# Patient Record
Sex: MALE | Race: BLACK OR AFRICAN AMERICAN | NOT HISPANIC OR LATINO | Employment: FULL TIME | URBAN - METROPOLITAN AREA
[De-identification: names, ages, dates, MRNs, and addresses within clinical notes are randomized per-mention and may not be internally consistent; named-entity substitution may affect disease eponyms.]

---

## 2022-06-11 ENCOUNTER — HOSPITAL ENCOUNTER (EMERGENCY)
Facility: HOSPITAL | Age: 32
Discharge: HOME | End: 2022-06-11
Attending: EMERGENCY MEDICINE

## 2022-06-11 VITALS
WEIGHT: 300 LBS | RESPIRATION RATE: 18 BRPM | DIASTOLIC BLOOD PRESSURE: 95 MMHG | BODY MASS INDEX: 39.76 KG/M2 | SYSTOLIC BLOOD PRESSURE: 165 MMHG | HEART RATE: 85 BPM | TEMPERATURE: 98 F | OXYGEN SATURATION: 96 % | HEIGHT: 73 IN

## 2022-06-11 DIAGNOSIS — S93.601A SPRAIN OF RIGHT FOOT, INITIAL ENCOUNTER: Primary | ICD-10-CM

## 2022-06-11 DIAGNOSIS — R60.9 SWELLING: ICD-10-CM

## 2022-06-11 PROCEDURE — 99283 EMERGENCY DEPT VISIT LOW MDM: CPT

## 2022-06-11 RX ORDER — NAPROXEN 500 MG/1
500 TABLET ORAL 2 TIMES DAILY WITH MEALS
Qty: 30 TABLET | Refills: 0 | Status: SHIPPED | OUTPATIENT
Start: 2022-06-11

## 2022-06-11 NOTE — ED NOTES
Ace wrap applied to right ankle.  Pt instructed on use of crutches and demonstrated their proper use.  Pt given reusable ice pack.

## 2022-06-11 NOTE — ED PROVIDER NOTES
Encounter Date: 6/11/2022       History     Chief Complaint   Patient presents with    Foot Injury     X 2 DAYS AGO, RT FOOT, SWELLING TODAY     32-year-old male presented emergency department with right foot and ankle pain.  Patient said he stepped wrong 2 days ago and felt pain in the right foot and since then having pain with weight-bearing.  Patient does have slight swelling on the dorsal aspect of the right foot.  Denies any other injuries.  Denies fever or chills.        Review of patient's allergies indicates:  No Known Allergies  Past Medical History:   Diagnosis Date    Obese      No past surgical history on file.  No family history on file.     Review of Systems   Constitutional: Negative.    HENT: Negative.    Eyes: Negative.    Respiratory: Negative.    Cardiovascular: Negative.    Gastrointestinal: Negative.    Endocrine: Negative.    Genitourinary: Negative.    Musculoskeletal: Positive for joint swelling.        Right foot swelling and tenderness on the dorsal aspect and has pain in that area.   Skin: Negative.    Allergic/Immunologic: Negative.    Neurological: Negative.    Hematological: Negative.    Psychiatric/Behavioral: Negative.    All other systems reviewed and are negative.      Physical Exam     Initial Vitals [06/11/22 1130]   BP Pulse Resp Temp SpO2   (!) 185/102 81 20 98.1 °F (36.7 °C) 95 %      MAP       --         Physical Exam    Nursing note and vitals reviewed.  Constitutional: He appears well-developed and well-nourished.   HENT:   Head: Normocephalic and atraumatic.   Nose: Nose normal.   Eyes: Conjunctivae and EOM are normal.   Neck: No tracheal deviation present.   Normal range of motion.  Cardiovascular: Normal rate, regular rhythm, normal heart sounds and intact distal pulses. Exam reveals no friction rub.    No murmur heard.  Pulmonary/Chest: Breath sounds normal. No respiratory distress. He has no wheezes. He has no rales.   Abdominal: Abdomen is soft. He exhibits no  distension. There is no abdominal tenderness.   Musculoskeletal:         General: Tenderness present. Normal range of motion.      Cervical back: Normal range of motion.      Comments: Tenderness on the dorsal aspect of the right foot with mild swelling.  No tenderness of the ankle.  No tenderness in the Achillis tendon area.  Full range of motion of the ankle joint.  Extremities neurovascularly intact.  No redness or signs of infection     Neurological: He is alert and oriented to person, place, and time. He has normal strength. GCS score is 15. GCS eye subscore is 4. GCS verbal subscore is 5. GCS motor subscore is 6.   Skin: Skin is warm and dry. Capillary refill takes less than 2 seconds.   Psychiatric: He has a normal mood and affect. Thought content normal.         ED Course   Procedures  Labs Reviewed - No data to display       Imaging Results          X-Ray Ankle Complete Right (Final result)  Result time 06/11/22 12:16:21    Final result by Nestor Perales MD (06/11/22 12:16:21)                 Narrative:    Reason: Foot Injury (X 2 DAYS AGO, RT FOOT, SWELLING TODAY)    FINDINGS:  Three views right ankle show no fracture, dislocation, or destructive osseous lesion. Soft tissues are unremarkable.    IMPRESSION:  Normal right ankle.    Electronically signed by:  Nestor Perales MD  6/11/2022 12:16 PM CDT Workstation: [id number]                             X-Ray Foot Complete Right (Final result)  Result time 06/11/22 12:17:40    Final result by Nestor Perales MD (06/11/22 12:17:40)                 Narrative:    Reason: Foot Injury (X 2 DAYS AGO, RT FOOT, SWELLING TODAY)    FINDINGS:  Three views of right foot show no fracture, dislocation, or destructive osseous lesion. Soft tissues are unremarkable. 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    IMPRESSION:  No acute right foot abnormality.    Electronically signed by:  Nestor Perales MD  6/11/2022 12:17 PM CDT Workstation: [id number]                                Medications   ibuprofen tablet 600 mg (has no administration in time range)     Medical Decision Making:   Differential Diagnosis:   32-year-old male with right foot sprain.  X-rays unremarkable and negative for fracture.  Ace wrap applied and extremities neurovascularly intact after Ace wrap application.  Crutches given and advised not to bear weight until pain resolves.  Pain treated.  Discharged with instructions and follow-up and return precautions.  Clinical Tests:   Radiological Study: Reviewed                      Clinical Impression:   Final diagnoses:  [R60.9] Swelling  [S93.601A] Sprain of right foot, initial encounter (Primary)          ED Disposition Condition    Discharge Stable        ED Prescriptions     Medication Sig Dispense Start Date End Date Auth. Provider    naproxen (NAPROSYN) 500 MG tablet Take 1 tablet (500 mg total) by mouth 2 (two) times daily with meals. 30 tablet 6/11/2022  Ethan Osman MD        Follow-up Information     Follow up With Specialties Details Why Contact Info    [facility]  In 2 days  [address] [facility] [address]  [phone]             Ethan Osman MD  06/11/22 1165

## 2022-06-11 NOTE — Clinical Note
"Miguel Ruizausten Keating was seen and treated in our emergency department on 6/11/2022.  He may return to work on 06/16/2022.       If you have any questions or concerns, please don't hesitate to call.      KEIKO Bolden RN    "

## 2022-11-10 ENCOUNTER — HOSPITAL ENCOUNTER (EMERGENCY)
Facility: HOSPITAL | Age: 32
Discharge: HOME | End: 2022-11-10
Attending: EMERGENCY MEDICINE

## 2022-11-10 VITALS
TEMPERATURE: 98 F | OXYGEN SATURATION: 98 % | RESPIRATION RATE: 18 BRPM | WEIGHT: 280 LBS | DIASTOLIC BLOOD PRESSURE: 99 MMHG | HEART RATE: 69 BPM | SYSTOLIC BLOOD PRESSURE: 170 MMHG | BODY MASS INDEX: 36.94 KG/M2

## 2022-11-10 DIAGNOSIS — M72.2 PLANTAR FASCIITIS: Primary | ICD-10-CM

## 2022-11-10 DIAGNOSIS — R52 PAIN: ICD-10-CM

## 2022-11-10 PROCEDURE — 99283 EMERGENCY DEPT VISIT LOW MDM: CPT

## 2022-11-10 PROCEDURE — 25000003 PHARM REV CODE 250: Performed by: NURSE PRACTITIONER

## 2022-11-10 RX ORDER — NAPROXEN 250 MG/1
500 TABLET ORAL
Status: COMPLETED | OUTPATIENT
Start: 2022-11-10 | End: 2022-11-10

## 2022-11-10 RX ORDER — DICLOFENAC SODIUM 50 MG/1
50 TABLET, DELAYED RELEASE ORAL 3 TIMES DAILY PRN
Qty: 20 TABLET | Refills: 2 | Status: SHIPPED | OUTPATIENT
Start: 2022-11-10

## 2022-11-10 RX ADMIN — NAPROXEN 500 MG: 250 TABLET ORAL at 02:11

## 2022-11-10 NOTE — DISCHARGE INSTRUCTIONS
By hard arch supports for your shoes.  Do not wear flat shoes.  Do the stretching exercises I have shown you.

## 2022-11-16 ENCOUNTER — PATIENT OUTREACH (OUTPATIENT)
Dept: EMERGENCY MEDICINE | Facility: HOSPITAL | Age: 32
End: 2022-11-16

## 2022-11-16 NOTE — PROGRESS NOTES
Abby Bailey  ED Navigator  Emergency Department    Project: [facility] ED Navigator  Role: Community Health Worker    Date: 11/16/2022  Patient Name: Miguel Keating  MRN: [id number]  PCP: Primary Doctor No    Assessment:     Miguel Keating is a 32 y.o. male who has presented to ED for foot pain. Patient has visited the ED 1 times in the past 3 months. Patient did not contact PCP.     ED Navigator Initial Assessment    ED Navigator Enrollment Documentation  Consent to Services  Does patient consent to completing the assessment?: Yes  Contact  Method of Initial Contact: Phone  Transportation  Does the patient have issues with Transportation?: No  Does the patient have transportation to and from healthcare appointments?: Yes  Insurance Coverage  Do you have coverage/adequate coverage?: No  Reason for no/inadequate coverage: Uninsured  Specialist Appointment  Did the patient come to the ED to see a specialist?: No  Does the patient have a pending specialist referral?: No  Does the patient have a specialist appointment made?: No  PCP Follow Up Appointment  Has the patient had an appointment with a primary care provider in the past year?: No  Does the patient have a follow up appontment with a PCP?: No  When was the last time you saw your PCP?: 11/16/21  Why does the patient not have a follow up scheduled?: No established [facility]/outside PCP  Would you like an [facility] PCP?: Yes  Medications  Is patient able to afford medication?: Yes  Is patient unable to get medication due to lack of transportation?: No  Psychological  Does the patient have psycho-social concerns?: No  Food  Does the patient have concerns about food?: No  Communication/Education  Does the patient have limited English proficiency/English not primary language?: No  Does patient have low literacy and/or low health literacy?: Yes  Does patient have concerns with care?: No  Does patient have dissatisfaction with care?: No  Other Financial Concerns  Does the patient have  immediate financial distress?: No  Does the patient have general financial concerns?: No  Other Social Barriers/Concerns  Does the patient have any additional barriers or concerns?: Dental  Primary Barrier  Barriers identified: Financial barrier (health insurance, employment, etc.)  Root Cause of ED Utilization: Lack of Access to Primary Care  Plan to address Lack of Access to Primary Care: Provided information for [facility] ([facility], [place], etc.), Provided information for [facility] On [facility] 24/7 Nurse Triage line [phone] or [phone] ([phone])  Next steps: Provided Education  Was education/educational materials provided surrounding PCP services/creating a medical home?: Yes Was education verbal or written?: Written     Was education/educational materials provided surrounding low cost, healthy foods?: Yes Was education verbal or written?: Written     Was education/educational materials provided surrounding other items? If so, use comment to explain.: Yes Was education verbal or written?: Written   Plan: Provided information for [facility] ([facility], [employer])  Expected Date of Follow Up 1: 12/14/22         Social History     Socioeconomic History    Marital status: Single     Social Determinants of Health     Financial Resource Strain: Low Risk     Difficulty of Paying Living Expenses: Not very hard   Food Insecurity: No Food Insecurity    Worried About Running Out of Food in the Last Year: Never true    Ran Out of Food in the Last Year: Never true   Transportation Needs: No Transportation Needs    Lack of Transportation (Medical): No    Lack of Transportation (Non-Medical): No   Stress: No Stress Concern Present    Feeling of Stress : Not at all   Social Connections: Unknown    Frequency of Communication with Friends and Family: Three  times a week    Frequency of Social Gatherings with Friends and Family: Three times a week    Marital Status: Never    Housing Stability: Unknown    Unable to Pay for Housing in the Last Year: No    Unstable Housing in the Last Year: No       Plan:   Spoke with patient on the telephone and completed initial enrollment.  Patient reported no concerns with food, housing, utilities, or transportation.  Patient stated he has never applied for Medicaid, and I provided information on how to do that.  Patient does not have a PCP, and I provided education on [facility] for healthcare.  Patient stated he has a chipped tooth, and I provided education on dental resources.  Patient is a smoker, does not drink alcohol, and denied feelings of depression/anxiety.  Patient was given education on ([facility] information, [facility] information, and Heart healthy diet tips), [facility] Nurse Triage line, and [facility].    Abby Bailey  ED Navigator

## 2022-12-15 ENCOUNTER — PATIENT OUTREACH (OUTPATIENT)
Dept: EMERGENCY MEDICINE | Facility: HOSPITAL | Age: 32
End: 2022-12-15

## 2022-12-16 NOTE — PROGRESS NOTES
ED navigator spoke to Mr. Rivera who advised that he was feeling much better. He advised that he had not had any appointments. He denied the need for any new resources. I advised that if he needed anything he could reach out and that I would follow up in a few weeks. He agreed to the follow up call.    JOSE ENRIQUE Miller  ED navigator

## 2023-01-05 ENCOUNTER — PATIENT OUTREACH (OUTPATIENT)
Dept: EMERGENCY MEDICINE | Facility: HOSPITAL | Age: 33
End: 2023-01-05

## 2023-01-06 NOTE — PROGRESS NOTES
ED navigator spoke Mr. Rivera who advised that he was doing well. He advised that he had not yet established care with a primary care doctor. He denied the need for any resources at this time. He was advised that he could call me if anything is needed and that I would follow up in a few weeks. He agreed to a follow up call.     JOSE ENRIQUE Miller  ED navigator